# Patient Record
Sex: MALE | Race: WHITE | NOT HISPANIC OR LATINO | URBAN - METROPOLITAN AREA
[De-identification: names, ages, dates, MRNs, and addresses within clinical notes are randomized per-mention and may not be internally consistent; named-entity substitution may affect disease eponyms.]

---

## 2020-07-04 ENCOUNTER — EMERGENCY (EMERGENCY)
Facility: HOSPITAL | Age: 50
LOS: 0 days | Discharge: HOME | End: 2020-07-05
Attending: EMERGENCY MEDICINE | Admitting: EMERGENCY MEDICINE
Payer: MEDICAID

## 2020-07-04 VITALS
SYSTOLIC BLOOD PRESSURE: 139 MMHG | RESPIRATION RATE: 18 BRPM | DIASTOLIC BLOOD PRESSURE: 77 MMHG | OXYGEN SATURATION: 98 % | TEMPERATURE: 99 F | HEART RATE: 87 BPM

## 2020-07-04 DIAGNOSIS — R55 SYNCOPE AND COLLAPSE: ICD-10-CM

## 2020-07-04 LAB
BASOPHILS # BLD AUTO: 0.05 K/UL — SIGNIFICANT CHANGE UP (ref 0–0.2)
BASOPHILS NFR BLD AUTO: 0.4 % — SIGNIFICANT CHANGE UP (ref 0–1)
EOSINOPHIL # BLD AUTO: 0.04 K/UL — SIGNIFICANT CHANGE UP (ref 0–0.7)
EOSINOPHIL NFR BLD AUTO: 0.3 % — SIGNIFICANT CHANGE UP (ref 0–8)
HCT VFR BLD CALC: 35.6 % — LOW (ref 42–52)
HGB BLD-MCNC: 11.6 G/DL — LOW (ref 14–18)
IMM GRANULOCYTES NFR BLD AUTO: 0.6 % — HIGH (ref 0.1–0.3)
LYMPHOCYTES # BLD AUTO: 2.55 K/UL — SIGNIFICANT CHANGE UP (ref 1.2–3.4)
LYMPHOCYTES # BLD AUTO: 21 % — SIGNIFICANT CHANGE UP (ref 20.5–51.1)
MCHC RBC-ENTMCNC: 30 PG — SIGNIFICANT CHANGE UP (ref 27–31)
MCHC RBC-ENTMCNC: 32.6 G/DL — SIGNIFICANT CHANGE UP (ref 32–37)
MCV RBC AUTO: 92 FL — SIGNIFICANT CHANGE UP (ref 80–94)
MONOCYTES # BLD AUTO: 0.87 K/UL — HIGH (ref 0.1–0.6)
MONOCYTES NFR BLD AUTO: 7.1 % — SIGNIFICANT CHANGE UP (ref 1.7–9.3)
NEUTROPHILS # BLD AUTO: 8.59 K/UL — HIGH (ref 1.4–6.5)
NEUTROPHILS NFR BLD AUTO: 70.6 % — SIGNIFICANT CHANGE UP (ref 42.2–75.2)
NRBC # BLD: 0 /100 WBCS — SIGNIFICANT CHANGE UP (ref 0–0)
PLATELET # BLD AUTO: 254 K/UL — SIGNIFICANT CHANGE UP (ref 130–400)
RBC # BLD: 3.87 M/UL — LOW (ref 4.7–6.1)
RBC # FLD: 12.8 % — SIGNIFICANT CHANGE UP (ref 11.5–14.5)
WBC # BLD: 12.17 K/UL — HIGH (ref 4.8–10.8)
WBC # FLD AUTO: 12.17 K/UL — HIGH (ref 4.8–10.8)

## 2020-07-04 PROCEDURE — 71045 X-RAY EXAM CHEST 1 VIEW: CPT | Mod: 26

## 2020-07-04 PROCEDURE — 99285 EMERGENCY DEPT VISIT HI MDM: CPT

## 2020-07-04 RX ORDER — SODIUM CHLORIDE 9 MG/ML
1000 INJECTION INTRAMUSCULAR; INTRAVENOUS; SUBCUTANEOUS ONCE
Refills: 0 | Status: COMPLETED | OUTPATIENT
Start: 2020-07-04 | End: 2020-07-04

## 2020-07-04 RX ADMIN — SODIUM CHLORIDE 1000 MILLILITER(S): 9 INJECTION INTRAMUSCULAR; INTRAVENOUS; SUBCUTANEOUS at 23:39

## 2020-07-04 NOTE — ED ADULT TRIAGE NOTE - CHIEF COMPLAINT QUOTE
pt BIBA from home, pt states he was laying on the couch when he began feeling light headed and passed out

## 2020-07-05 ENCOUNTER — INPATIENT (INPATIENT)
Facility: HOSPITAL | Age: 50
LOS: 1 days | Discharge: HOME | End: 2020-07-07
Attending: FAMILY MEDICINE | Admitting: FAMILY MEDICINE
Payer: MEDICAID

## 2020-07-05 VITALS
SYSTOLIC BLOOD PRESSURE: 140 MMHG | OXYGEN SATURATION: 98 % | RESPIRATION RATE: 18 BRPM | HEART RATE: 99 BPM | DIASTOLIC BLOOD PRESSURE: 81 MMHG

## 2020-07-05 VITALS
DIASTOLIC BLOOD PRESSURE: 68 MMHG | RESPIRATION RATE: 22 BRPM | HEIGHT: 68 IN | OXYGEN SATURATION: 99 % | SYSTOLIC BLOOD PRESSURE: 128 MMHG | WEIGHT: 270.07 LBS | TEMPERATURE: 99 F | HEART RATE: 106 BPM

## 2020-07-05 LAB
ABO RH CONFIRMATION: SIGNIFICANT CHANGE UP
ALBUMIN SERPL ELPH-MCNC: 4.1 G/DL — SIGNIFICANT CHANGE UP (ref 3.5–5.2)
ALBUMIN SERPL ELPH-MCNC: 4.6 G/DL — SIGNIFICANT CHANGE UP (ref 3.5–5.2)
ALP SERPL-CCNC: 38 U/L — SIGNIFICANT CHANGE UP (ref 30–115)
ALP SERPL-CCNC: 39 U/L — SIGNIFICANT CHANGE UP (ref 30–115)
ALT FLD-CCNC: 29 U/L — SIGNIFICANT CHANGE UP (ref 0–41)
ALT FLD-CCNC: 35 U/L — SIGNIFICANT CHANGE UP (ref 0–41)
ANION GAP SERPL CALC-SCNC: 10 MMOL/L — SIGNIFICANT CHANGE UP (ref 7–14)
ANION GAP SERPL CALC-SCNC: 12 MMOL/L — SIGNIFICANT CHANGE UP (ref 7–14)
APTT BLD: 28.7 SEC — SIGNIFICANT CHANGE UP (ref 27–39.2)
AST SERPL-CCNC: 17 U/L — SIGNIFICANT CHANGE UP (ref 0–41)
AST SERPL-CCNC: 17 U/L — SIGNIFICANT CHANGE UP (ref 0–41)
BASOPHILS # BLD AUTO: 0.04 K/UL — SIGNIFICANT CHANGE UP (ref 0–0.2)
BASOPHILS NFR BLD AUTO: 0.4 % — SIGNIFICANT CHANGE UP (ref 0–1)
BILIRUB SERPL-MCNC: 0.4 MG/DL — SIGNIFICANT CHANGE UP (ref 0.2–1.2)
BILIRUB SERPL-MCNC: <0.2 MG/DL — SIGNIFICANT CHANGE UP (ref 0.2–1.2)
BLD GP AB SCN SERPL QL: SIGNIFICANT CHANGE UP
BUN SERPL-MCNC: 37 MG/DL — HIGH (ref 10–20)
BUN SERPL-MCNC: 51 MG/DL — HIGH (ref 10–20)
CALCIUM SERPL-MCNC: 8.4 MG/DL — LOW (ref 8.5–10.1)
CALCIUM SERPL-MCNC: 9.5 MG/DL — SIGNIFICANT CHANGE UP (ref 8.5–10.1)
CHLORIDE SERPL-SCNC: 102 MMOL/L — SIGNIFICANT CHANGE UP (ref 98–110)
CHLORIDE SERPL-SCNC: 104 MMOL/L — SIGNIFICANT CHANGE UP (ref 98–110)
CO2 SERPL-SCNC: 22 MMOL/L — SIGNIFICANT CHANGE UP (ref 17–32)
CO2 SERPL-SCNC: 25 MMOL/L — SIGNIFICANT CHANGE UP (ref 17–32)
CREAT SERPL-MCNC: 0.8 MG/DL — SIGNIFICANT CHANGE UP (ref 0.7–1.5)
CREAT SERPL-MCNC: 0.9 MG/DL — SIGNIFICANT CHANGE UP (ref 0.7–1.5)
EOSINOPHIL # BLD AUTO: 0.04 K/UL — SIGNIFICANT CHANGE UP (ref 0–0.7)
EOSINOPHIL NFR BLD AUTO: 0.4 % — SIGNIFICANT CHANGE UP (ref 0–8)
GLUCOSE SERPL-MCNC: 108 MG/DL — HIGH (ref 70–99)
GLUCOSE SERPL-MCNC: 119 MG/DL — HIGH (ref 70–99)
HCT VFR BLD CALC: 26.8 % — LOW (ref 42–52)
HGB BLD-MCNC: 9.2 G/DL — LOW (ref 14–18)
IMM GRANULOCYTES NFR BLD AUTO: 0.7 % — HIGH (ref 0.1–0.3)
INR BLD: 1.01 RATIO — SIGNIFICANT CHANGE UP (ref 0.65–1.3)
LIDOCAIN IGE QN: 62 U/L — HIGH (ref 7–60)
LYMPHOCYTES # BLD AUTO: 2.84 K/UL — SIGNIFICANT CHANGE UP (ref 1.2–3.4)
LYMPHOCYTES # BLD AUTO: 27.3 % — SIGNIFICANT CHANGE UP (ref 20.5–51.1)
MAGNESIUM SERPL-MCNC: 2 MG/DL — SIGNIFICANT CHANGE UP (ref 1.8–2.4)
MCHC RBC-ENTMCNC: 30.9 PG — SIGNIFICANT CHANGE UP (ref 27–31)
MCHC RBC-ENTMCNC: 34.3 G/DL — SIGNIFICANT CHANGE UP (ref 32–37)
MCV RBC AUTO: 89.9 FL — SIGNIFICANT CHANGE UP (ref 80–94)
MONOCYTES # BLD AUTO: 0.59 K/UL — SIGNIFICANT CHANGE UP (ref 0.1–0.6)
MONOCYTES NFR BLD AUTO: 5.7 % — SIGNIFICANT CHANGE UP (ref 1.7–9.3)
NEUTROPHILS # BLD AUTO: 6.82 K/UL — HIGH (ref 1.4–6.5)
NEUTROPHILS NFR BLD AUTO: 65.5 % — SIGNIFICANT CHANGE UP (ref 42.2–75.2)
NRBC # BLD: 0 /100 WBCS — SIGNIFICANT CHANGE UP (ref 0–0)
PLATELET # BLD AUTO: 231 K/UL — SIGNIFICANT CHANGE UP (ref 130–400)
POTASSIUM SERPL-MCNC: 4.3 MMOL/L — SIGNIFICANT CHANGE UP (ref 3.5–5)
POTASSIUM SERPL-MCNC: 4.5 MMOL/L — SIGNIFICANT CHANGE UP (ref 3.5–5)
POTASSIUM SERPL-SCNC: 4.3 MMOL/L — SIGNIFICANT CHANGE UP (ref 3.5–5)
POTASSIUM SERPL-SCNC: 4.5 MMOL/L — SIGNIFICANT CHANGE UP (ref 3.5–5)
PROT SERPL-MCNC: 5.7 G/DL — LOW (ref 6–8)
PROT SERPL-MCNC: 6.1 G/DL — SIGNIFICANT CHANGE UP (ref 6–8)
PROTHROM AB SERPL-ACNC: 11.6 SEC — SIGNIFICANT CHANGE UP (ref 9.95–12.87)
RBC # BLD: 2.98 M/UL — LOW (ref 4.7–6.1)
RBC # FLD: 12.8 % — SIGNIFICANT CHANGE UP (ref 11.5–14.5)
SALICYLATES SERPL-MCNC: <0.3 MG/DL — LOW (ref 4–30)
SARS-COV-2 RNA SPEC QL NAA+PROBE: SIGNIFICANT CHANGE UP
SODIUM SERPL-SCNC: 136 MMOL/L — SIGNIFICANT CHANGE UP (ref 135–146)
SODIUM SERPL-SCNC: 139 MMOL/L — SIGNIFICANT CHANGE UP (ref 135–146)
TROPONIN T SERPL-MCNC: <0.01 NG/ML — SIGNIFICANT CHANGE UP
TROPONIN T SERPL-MCNC: <0.01 NG/ML — SIGNIFICANT CHANGE UP
WBC # BLD: 10.4 K/UL — SIGNIFICANT CHANGE UP (ref 4.8–10.8)
WBC # FLD AUTO: 10.4 K/UL — SIGNIFICANT CHANGE UP (ref 4.8–10.8)

## 2020-07-05 PROCEDURE — 99223 1ST HOSP IP/OBS HIGH 75: CPT

## 2020-07-05 PROCEDURE — 71045 X-RAY EXAM CHEST 1 VIEW: CPT | Mod: 26

## 2020-07-05 PROCEDURE — 99285 EMERGENCY DEPT VISIT HI MDM: CPT

## 2020-07-05 PROCEDURE — 99222 1ST HOSP IP/OBS MODERATE 55: CPT

## 2020-07-05 RX ORDER — PANTOPRAZOLE SODIUM 20 MG/1
8 TABLET, DELAYED RELEASE ORAL
Qty: 80 | Refills: 0 | Status: DISCONTINUED | OUTPATIENT
Start: 2020-07-05 | End: 2020-07-06

## 2020-07-05 RX ORDER — ONDANSETRON 8 MG/1
4 TABLET, FILM COATED ORAL EVERY 6 HOURS
Refills: 0 | Status: DISCONTINUED | OUTPATIENT
Start: 2020-07-05 | End: 2020-07-07

## 2020-07-05 RX ORDER — SODIUM CHLORIDE 9 MG/ML
1000 INJECTION INTRAMUSCULAR; INTRAVENOUS; SUBCUTANEOUS
Refills: 0 | Status: DISCONTINUED | OUTPATIENT
Start: 2020-07-05 | End: 2020-07-06

## 2020-07-05 RX ORDER — PANTOPRAZOLE SODIUM 20 MG/1
40 TABLET, DELAYED RELEASE ORAL ONCE
Refills: 0 | Status: COMPLETED | OUTPATIENT
Start: 2020-07-05 | End: 2020-07-05

## 2020-07-05 RX ADMIN — PANTOPRAZOLE SODIUM 10 MG/HR: 20 TABLET, DELAYED RELEASE ORAL at 21:42

## 2020-07-05 RX ADMIN — PANTOPRAZOLE SODIUM 40 MILLIGRAM(S): 20 TABLET, DELAYED RELEASE ORAL at 20:41

## 2020-07-05 NOTE — H&P ADULT - NSHPLABSRESULTS_GEN_ALL_CORE
9.2    10.40 )-----------( 231      ( 05 Jul 2020 20:40 )             26.8       07-05    136  |  104  |  37<H>  ----------------------------<  119<H>  4.3   |  22  |  0.9    Ca    8.4<L>      05 Jul 2020 20:40  Mg     2.0     07-04    TPro  5.7<L>  /  Alb  4.1  /  TBili  <0.2  /  DBili  x   /  AST  17  /  ALT  29  /  AlkPhos  38  07-05                  PT/INR - ( 05 Jul 2020 20:40 )   PT: 11.60 sec;   INR: 1.01 ratio         PTT - ( 05 Jul 2020 20:40 )  PTT:28.7 sec    Lactate Trend      CARDIAC MARKERS ( 05 Jul 2020 20:40 )  x     / <0.01 ng/mL / x     / x     / x      CARDIAC MARKERS ( 04 Jul 2020 23:32 )  x     / <0.01 ng/mL / x     / x     / x            CAPILLARY BLOOD GLUCOSE      POCT Blood Glucose.: 111 mg/dL (04 Jul 2020 22:43)

## 2020-07-05 NOTE — ED PROVIDER NOTE - OBJECTIVE STATEMENT
49 year old male no past medical history p/w near syncope. Patient seen in Ed yesterday and d/c home after negative w/u. Tpday patient felt lightheaded but did not pass out. He also admits to having a large black bowel movement. patient denies hx of melena/gib but does admit to taking meloxican, aspirin in combination the past week. He denies chest pain, shortness of breath, fevers, cough, abd pain, hematuria, dysuria. no bloodthinners/trauma. His lightheadedness is worse with standing, better with lying down, intermittent, mild, worsening. never had colo or seen by gi. + FH colon CA

## 2020-07-05 NOTE — ED PROVIDER NOTE - ATTENDING CONTRIBUTION TO CARE
I personally evaluated the patient. I reviewed the Resident’s or Physician Assistant’s note (as assigned above), and agree with the findings and plan except as documented in my note.    Pt is a 50 y/o male with no PMH who presents to ED for syncopal episode that occurred PTA while pt was sitting on couch, + tunnel vision prior to episode, no chest pain, SOB, palpitations, abd pain, n/v/d. Pt states was outside all day today, + 1 drink. Sx's were mild, constant, resolved. Currently asymptomatic.  Constitutional: Well developed, well nourished. NAD.  Head: Normocephalic, atraumatic.  Eyes: PERRL. EOMI.  ENT: No nasal discharge. Mucous membranes moist.  Neck: Supple. Painless ROM.  Cardiovascular: Normal S1, S2. Regular rate and rhythm. No murmurs, rubs, or gallops.  Pulmonary: Normal respiratory rate and effort. Lungs clear to auscultation bilaterally. No wheezing, rales, or rhonchi.  Abdominal: Soft. Nondistended. Nontender. No rebound, guarding, rigidity.  Extremities. Pelvis stable. No lower extremity edema, symmetric calves.  Skin: No rashes, cyanosis.  Neuro: AAOx3. No focal neurological deficits.  Psych: Normal mood. Normal affect.

## 2020-07-05 NOTE — H&P ADULT - NSHPPHYSICALEXAM_GEN_ALL_CORE
PHYSICAL EXAM:    CONSTITUTIONAL: NAD, saturating at >90% on RA.   ENMT: EOMI, PERRLA,  neck supple, No JVD  RESPIRATORY: Clear to percussion bilaterally; No rales, rhonchi, wheezing, or rubs  CARDIOVASCULAR: Regular rate and rhythm; No murmurs, rubs, or gallops, negative edema  GASTROINTESTINAL: Soft, Nontender, Nondistended; Bowel sounds present  EXTREMITIES:  2+ Peripheral Pulses, No clubbing, cyanosis  PSYCH: Alert & Oriented X3, denies suicidal or homicidal ideation, denies auditory or visual hallucinations   NEURO: A&O X3, follows commands. Non focal neuro exam.   SKIN: No rashes or lesions PHYSICAL EXAM:    CONSTITUTIONAL: NAD, saturating at >90% on RA.   ENMT: EOMI, PERRLA,  neck supple, No JVD  RESPIRATORY: Clear to percussion bilaterally; No rales, rhonchi, wheezing, or rubs  CARDIOVASCULAR: Regular rate and rhythm; No murmurs, rubs, or gallops, negative edema  GASTROINTESTINAL: Soft, Nontender, Nondistended; Bowel sounds present  EXTREMITIES:  2+ Peripheral Pulses, No clubbing, cyanosis  PSYCH: Alert & Oriented X3, denies suicidal or homicidal ideation, denies auditory or visual hallucinations   NEURO: A&O X3, follows commands. Non focal neuro exam.   SKIN: No rashes or lesions    /60 HR98

## 2020-07-05 NOTE — ED PROVIDER NOTE - PATIENT PORTAL LINK FT
You can access the FollowMyHealth Patient Portal offered by [facility] by registering at the following website: [email or web address]. By joining [employer]’s FollowMyHealth portal, you will also be able to view your health information using other applications (apps) compatible with our system.

## 2020-07-05 NOTE — ED PROVIDER NOTE - PHYSICAL EXAMINATION
Physical Exam    Vital Signs: I have reviewed the initial vital signs  Constitutional: well-nourished, appears stated age, no acute distress  EENT: Conjunctiva pink, Sclera clear, PERRLA, EOMI. Mucous membranes moist, no exudates or lesions noted, uvula midline.  Cardiovascular: S1 and S2 present, tachycardic, regular rhythm. Well perfused extremities, no peripheral edema  Respiratory: unlabored respiratory effort, clear to auscultation bilaterally no wheezing, rales or rhonchi  Gastrointestinal: soft, non-tender abdomen. No guarding or rebound tenderness  Musculoskeletal: supple nontender neck, no midline tenderness, no joint pain  Integumentary: warm, dry, no rash  Rectal: No bleeding, rash, or lesions noted externally. Sphincter tone intact. Black stool. Prostate within normal limits  Neurologic: A & O x 3, CN II-XII grossly intact, all extremities’ motor and sensory functions grossly intact  Psychiatric: appropriate mood, appropriate affect

## 2020-07-05 NOTE — CONSULT NOTE ADULT - SUBJECTIVE AND OBJECTIVE BOX
50 yo W male w/ PMH arthritis on meloxicam, advil, aspirins, pt was in ER c/o of near syncopal episode ,  w/u showed mild anemia pt. was discharged told to follow up w/PMD.  today pt returns w/ large melanotic stools w/ 2.4 gram blood loss (11.6-9.2).  Pt denies -tobacco use,  etoh abuse (but drinks socially) but had some drinks yesterday.    presently pts VSS , afebrile.  GI ( Dr Godinez was called and recommended adm. for egd)            PAST MEDICAL & SURGICAL HISTORY:  hx-arthritis on NSAIDS  No significant past surgical history    Allergies    No Known Allergies    Intolerances      FAMILY HISTORY:    Home Medications:  aspirin 81 mg oral tablet, chewable: 1 tab(s) orally once a day (04 Jul 2020 23:17)  meloxicam 10 mg oral capsule: 1 cap(s) orally once a day (04 Jul 2020 23:17)    Occupation:  [employer]: social drinker  Smoking: Denied  Drug Use: Denied  Marital Status:         ROS: as in HPI; All other systems reviewed are negative    ICU Vital Signs Last 24 Hrs  T(C): 37.1 (05 Jul 2020 20:17), Max: 37.2 (04 Jul 2020 22:42)  T(F): 98.7 (05 Jul 2020 20:17), Max: 99 (04 Jul 2020 22:42)  HR: 106 (05 Jul 2020 20:17) (87 - 106)  BP: 128/68 (05 Jul 2020 20:17) (128/68 - 140/81)  BP(mean): --  ABP: --  ABP(mean): --  RR: 22 (05 Jul 2020 20:17) (18 - 22)  SpO2: 99% (05 Jul 2020 20:17) (98% - 99%)        Physical Examination:    General:  Middle aged w/ male .No acute distress.  Alert, oriented, interactive, nonfocal    HEENT: Pupils equal, reactive to light.  Symmetric.    PULM: Clear to auscultation bilaterally, no significant sputum production    CVS: Regular rate and rhythm, no murmurs, rubs, or gallops    ABD: Soft, nondistended, nontender, normoactive bowel sounds, no masses    EXT: No edema, nontender    SKIN: Warm and well perfused, no rashes noted.              I&O's Detail        LABS:                        9.2    10.40 )-----------( 231      ( 05 Jul 2020 20:40 )             26.8     05 Jul 2020 20:40    136    |  104    |  37     ----------------------------<  119    4.3     |  22     |  0.9      Ca    8.4        05 Jul 2020 20:40  Mg     2.0       04 Jul 2020 23:32    TPro  5.7    /  Alb  4.1    /  TBili  <0.2   /  DBili  x      /  AST  17     /  ALT  29     /  AlkPhos  38     05 Jul 2020 20:40  Amylase x     lipase 62         CARDIAC MARKERS ( 05 Jul 2020 20:40 )  x     / <0.01 ng/mL / x     / x     / x      CARDIAC MARKERS ( 04 Jul 2020 23:32 )  x     / <0.01 ng/mL / x     / x     / x          CAPILLARY BLOOD GLUCOSE      POCT Blood Glucose.: 111 mg/dL (04 Jul 2020 22:43)    PT/INR - ( 05 Jul 2020 20:40 )   PT: 11.60 sec;   INR: 1.01 ratio         PTT - ( 05 Jul 2020 20:40 )  PTT:28.7 sec    Culture        MEDICATIONS  (STANDING):    MEDICATIONS  (PRN):        RADIOLOGY: ***     CXR:clear         IMPRESSION:  near syncope secondary to acute blood loss anemia        PLAN:    discussed  w/Dr Quiñonez (ICU)  Pt's VSS afebrile  no need for ICU @ this time  already on IV protonix  GI already was called for egd in am  adm to low risk tele  repeat CBC in 6 hr  transfuse for hgb <8  keep npo for EGD in am  I          CRITICAL CARE TIME SPENT: ***

## 2020-07-05 NOTE — ED PROVIDER NOTE - OBJECTIVE STATEMENT
48 y/o M no pmhx p/w syncope, patient was on couch, sts preceding tunnel vision and had syncopal episode, np preceding chest pain, shortness of breath or lightheadedness. States he was outside all day, admits to poor PO intake of fluids, no vomiting or diarrhea, no complaints in the ED now.

## 2020-07-05 NOTE — ED PROVIDER NOTE - CARE PROVIDER_API CALL
Eileen Adnrzej  INTERNAL MEDICINE  [address] [place] [id number]  Phone: [phone]  Fax: [phone]  Follow Up Time: 1-3 Days

## 2020-07-05 NOTE — H&P ADULT - ASSESSMENT
This is a 40 YO M who presents with a GI bleed. Currently hemodynamically stable, not actively bleeding. Will monitor CBC closely, transfuse PRN, NS @100ml/hr, protonix drip, GI consulted in the ER and will scope in the AM, NPO for now. Critical care reccs appreciated.       DVT ppx SCD This is a 40 YO M who presents with a GI bleed. Currently hemodynamically stable, not actively bleeding. Will monitor CBC closely, transfuse PRN, NS @100ml/hr, protonix drip, GI consulted in the ER and will scope in the AM, NPO for now. Critical care reccs appreciated. Symptomatic normocytic anemia due to GIB, hemodynamically stable, monitor cbc, transfuse PRN.       DVT ppx SCD

## 2020-07-05 NOTE — ED PROVIDER NOTE - CLINICAL SUMMARY MEDICAL DECISION MAKING FREE TEXT BOX
Pt presented to ED for near syncope, seen yesterday for similar c/o with normal workup. Today also with melena, confirmed with rectal exam. Hbg drop since yesterday. No need for acute transfusion, VSS. PPI started. Discussed with GI, will see pt in am. Discussed with ICU, not candidate at this time. Endorsed to medical team.

## 2020-07-05 NOTE — H&P ADULT - HISTORY OF PRESENT ILLNESS
Pt is a 40 YO M with a pmh of arthritis. Pt presented to the ER due to a large melanotic bowel movement associated with lightheadedness earlier this evening. Pt does endorse taking extra strength bryan, molixicam (which he has been taking for years). In the ER, pt noted to have a hb of 9.2, tachycardic although otherwise hemodynamically stable, melanotic stools in his rectal vault. Pt was seen by critical care and deemed not a candidate for an ICU admission. Pt was seen and examined at bedside. Pt denies any hx of blood in his stools, and denies nausea, vomiting, hematemesis, abdominal pain.

## 2020-07-05 NOTE — CONSULT NOTE ADULT - SUBJECTIVE AND OBJECTIVE BOX
Patient is a 49y old  Male who presents with a chief complaint of block stool    HPI:  pt c/o dizziness, near syncope, w/ black stools. pt was in ER yesterday and was discharged home. Pt now returns w/2.4 gm loss in Hemoglobin (11.6 to 9.2)  w/large melena. Pt uses meloxicam, ibuprofen, aspirin fro chronic arthritis, pt denies: alcohol abuse (but drinks socially), or tobacco use. Presently, VSS, afebrile. GI was already called.       PAST MEDICAL & SURGICAL HISTORY:  Arthritis on NSAIDS  No significant past surgical history    Allergies    No Known Allergies    Intolerances      FAMILY HISTORY:    Home Medications:  aspirin 81 mg oral tablet, chewable: 1 tab(s) orally once a day (04 Jul 2020 23:17)  meloxicam 10 mg oral capsule: 1 cap(s) orally once a day (04 Jul 2020 23:17)    Occupation:  Alochol: Denied  Smoking: Denied  Drug Use: Denied  Marital Status:         ROS: as in HPI; All other systems reviewed are negative    ICU Vital Signs Last 24 Hrs  T(C): 37.1 (05 Jul 2020 20:17), Max: 37.1 (05 Jul 2020 20:17)  T(F): 98.7 (05 Jul 2020 20:17), Max: 98.7 (05 Jul 2020 20:17)  HR: 106 (05 Jul 2020 20:17) (99 - 106)  BP: 128/68 (05 Jul 2020 20:17) (128/68 - 140/81)  BP(mean): --  ABP: --  ABP(mean): --  RR: 22 (05 Jul 2020 20:17) (18 - 22)  SpO2: 99% (05 Jul 2020 20:17) (98% - 99%)        Physical Examination:    General: Middle aged male , WD, WN in no acute distress.  Alert, oriented, interactive, nonfocal    HEENT: Pupils equal, reactive to light.  Symmetric.    PULM: Clear to auscultation bilaterally, no significant sputum production    CVS: Regular rate and rhythm, no murmurs, rubs, or gallops    ABD: Soft, nondistended, nontender, normoactive bowel sounds, no masses    EXT: No edema, nontender    SKIN: Warm and well perfused, no rashes noted.              I&O's Detail        LABS:                        9.2    10.40 )-----------( 231      ( 05 Jul 2020 20:40 )             26.8     05 Jul 2020 20:40    136    |  104    |  37     ----------------------------<  119    4.3     |  22     |  0.9      Ca    8.4        05 Jul 2020 20:40  Mg     2.0       04 Jul 2020 23:32    TPro  5.7    /  Alb  4.1    /  TBili  <0.2   /  DBili  x      /  AST  17     /  ALT  29     /  AlkPhos  38     05 Jul 2020 20:40  Amylase x     lipase 62         CARDIAC MARKERS ( 05 Jul 2020 20:40 )  x     / <0.01 ng/mL / x     / x     / x      CARDIAC MARKERS ( 04 Jul 2020 23:32 )  x     / <0.01 ng/mL / x     / x     / x          CAPILLARY BLOOD GLUCOSE      POCT Blood Glucose.: 111 mg/dL (04 Jul 2020 22:43)    PT/INR - ( 05 Jul 2020 20:40 )   PT: 11.60 sec;   INR: 1.01 ratio         PTT - ( 05 Jul 2020 20:40 )  PTT:28.7 sec    Culture        MEDICATIONS  (STANDING):  pantoprazole Infusion 8 mG/Hr (10 mL/Hr) IV Continuous <Continuous>    MEDICATIONS  (PRN):        RADIOLOGY: ***     CXR: clear                IMPRESSION:  Acute blood loss anemia 2 to acute upper GI bleed        PLAN:  Discussed w/intensivist Olamide  pt's VSS afebrile  No need for ICU @ this time  keep npo for egd in am as per GI 's initial impression  IV protonix  cbc q 6-8 hrs  transfuse if hgb <8          CRITICAL CARE TIME SPENT: ***

## 2020-07-05 NOTE — ED PROVIDER NOTE - CLINICAL SUMMARY MEDICAL DECISION MAKING FREE TEXT BOX
Pt with no PMH presented to eD for syncope, currently back to baseline. Likely vasovagal after dehydration, being outside all day. Labs, imaging EKG obtained and reviewed. Low risk per edwards joyce syncope rule. Results reviewed and discussed with pt and printed for patient. Anticipatory guidance given including close outpatient followup. Strict return precautions given. Pt verbalizes understanding of and agrees with plan.

## 2020-07-05 NOTE — ED PROVIDER NOTE - PROGRESS NOTE DETAILS
PODLOG: Discussed with Dr. Godinez. Plan is NPO, scope tomorrow unless decompensates. Discussed with ICU, will evaluate pt.

## 2020-07-05 NOTE — ED PROVIDER NOTE - NS ED ROS FT
Review of Systems         Constitutional: (-) fever (-) chills (-) weakness       EENT: (-) visual changes (-) sore throat (-) congestion       Cardiovascular: (-) chest pain (-) syncope       Respiratory: (-) cough, (-) shortness of breath       Gastrointestinal: (-) abdominal pain (-) vomiting (-) diarrhea (-) nausea (-) constipation       Genitourinary: (-) dysuria (-) frequency (-) hematuria       Musculoskeletal: (-) neck pain (-) back pain (-) joint pain       Integumentary: (-) rash       Neurological: (-) headache (-) altered mental status (+) dizziness (-) paresthesias       Psych: (-) psych history

## 2020-07-05 NOTE — ED PROVIDER NOTE - PHYSICAL EXAMINATION
VITAL SIGNS: I have reviewed nursing notes and confirm.  CONSTITUTIONAL: well-appearing, non-toxic  SKIN: Warm dry, normal skin turgor  HEAD: NCAT  EYES: EOMI, PERRLA, no scleral icterus  ENT: dry mucous membranes,   NECK: Supple; non tender. Full ROM.   CARD: RRR, no murmurs, rubs or gallops  RESP: clear to ausculation b/l.  No rales, rhonchi, or wheezing.  ABD: soft, + BS, non-tender, non-distended, no rebound or guarding.   EXT: Full ROM, no bony tenderness, no pedal edema, no calf tenderness  NEURO: normal motor. normal sensory. CN II-XII intact. Cerebellar testing normal. Normal gait.  PSYCH: Cooperative, appropriate.

## 2020-07-05 NOTE — ED PROVIDER NOTE - ATTENDING CONTRIBUTION TO CARE
I personally evaluated the patient. I reviewed the Resident’s or Physician Assistant’s note (as assigned above), and agree with the findings and plan except as documented in my note.    Pt is a 48 y/o male with no PMH, takes NSAIDs and ASA for joint pain, here yesterday for syncope episode, presents to ED for another episode of lightheadedness that occurred PTA. Mild, constant, resolved. Pt also with episode of melena. No BRBPR, abd pain, n/v, chest pain, SOB, cough.    Constitutional: Well developed, well nourished. NAD.  Head: Normocephalic, atraumatic.  Eyes: PERRL. EOMI.  ENT: No nasal discharge. Mucous membranes moist.  Neck: Supple. Painless ROM.  Cardiovascular: Normal S1, S2. Regular rate and rhythm. No murmurs, rubs, or gallops.  Pulmonary: Normal respiratory rate and effort. Lungs clear to auscultation bilaterally. No wheezing, rales, or rhonchi.  Abdominal: Soft. Nondistended. Nontender. No rebound, guarding, rigidity.  Rectal: + melena.  Extremities. Pelvis stable. No lower extremity edema, symmetric calves.  Skin: No rashes, cyanosis.  Neuro: AAOx3. No focal neurological deficits.  Psych: Normal mood. Normal affect.

## 2020-07-06 ENCOUNTER — TRANSCRIPTION ENCOUNTER (OUTPATIENT)
Age: 50
End: 2020-07-06

## 2020-07-06 ENCOUNTER — RESULT REVIEW (OUTPATIENT)
Age: 50
End: 2020-07-06

## 2020-07-06 LAB
HCT VFR BLD CALC: 23.3 % — LOW (ref 42–52)
HCT VFR BLD CALC: 24 % — LOW (ref 42–52)
HCT VFR BLD CALC: 24.4 % — LOW (ref 42–52)
HGB BLD-MCNC: 7.9 G/DL — LOW (ref 14–18)
HGB BLD-MCNC: 8.1 G/DL — LOW (ref 14–18)
HGB BLD-MCNC: 8.4 G/DL — LOW (ref 14–18)
LIDOCAIN IGE QN: 55 U/L — SIGNIFICANT CHANGE UP (ref 7–60)
MCHC RBC-ENTMCNC: 30.3 PG — SIGNIFICANT CHANGE UP (ref 27–31)
MCHC RBC-ENTMCNC: 30.4 PG — SIGNIFICANT CHANGE UP (ref 27–31)
MCHC RBC-ENTMCNC: 30.5 PG — SIGNIFICANT CHANGE UP (ref 27–31)
MCHC RBC-ENTMCNC: 33.8 G/DL — SIGNIFICANT CHANGE UP (ref 32–37)
MCHC RBC-ENTMCNC: 33.9 G/DL — SIGNIFICANT CHANGE UP (ref 32–37)
MCHC RBC-ENTMCNC: 34.4 G/DL — SIGNIFICANT CHANGE UP (ref 32–37)
MCV RBC AUTO: 88.4 FL — SIGNIFICANT CHANGE UP (ref 80–94)
MCV RBC AUTO: 89.9 FL — SIGNIFICANT CHANGE UP (ref 80–94)
MCV RBC AUTO: 90 FL — SIGNIFICANT CHANGE UP (ref 80–94)
NRBC # BLD: 0 /100 WBCS — SIGNIFICANT CHANGE UP (ref 0–0)
PLATELET # BLD AUTO: 158 K/UL — SIGNIFICANT CHANGE UP (ref 130–400)
PLATELET # BLD AUTO: 176 K/UL — SIGNIFICANT CHANGE UP (ref 130–400)
PLATELET # BLD AUTO: 182 K/UL — SIGNIFICANT CHANGE UP (ref 130–400)
RBC # BLD: 2.59 M/UL — LOW (ref 4.7–6.1)
RBC # BLD: 2.67 M/UL — LOW (ref 4.7–6.1)
RBC # BLD: 2.76 M/UL — LOW (ref 4.7–6.1)
RBC # FLD: 12.9 % — SIGNIFICANT CHANGE UP (ref 11.5–14.5)
RBC # FLD: 13.5 % — SIGNIFICANT CHANGE UP (ref 11.5–14.5)
RBC # FLD: 13.7 % — SIGNIFICANT CHANGE UP (ref 11.5–14.5)
SARS-COV-2 IGG SERPL QL IA: NEGATIVE — SIGNIFICANT CHANGE UP
SARS-COV-2 IGM SERPL IA-ACNC: <0.1 INDEX — SIGNIFICANT CHANGE UP
WBC # BLD: 7.55 K/UL — SIGNIFICANT CHANGE UP (ref 4.8–10.8)
WBC # BLD: 7.76 K/UL — SIGNIFICANT CHANGE UP (ref 4.8–10.8)
WBC # BLD: 8.02 K/UL — SIGNIFICANT CHANGE UP (ref 4.8–10.8)
WBC # FLD AUTO: 7.55 K/UL — SIGNIFICANT CHANGE UP (ref 4.8–10.8)
WBC # FLD AUTO: 7.76 K/UL — SIGNIFICANT CHANGE UP (ref 4.8–10.8)
WBC # FLD AUTO: 8.02 K/UL — SIGNIFICANT CHANGE UP (ref 4.8–10.8)

## 2020-07-06 PROCEDURE — 88312 SPECIAL STAINS GROUP 1: CPT | Mod: 26

## 2020-07-06 PROCEDURE — 99233 SBSQ HOSP IP/OBS HIGH 50: CPT

## 2020-07-06 PROCEDURE — 43239 EGD BIOPSY SINGLE/MULTIPLE: CPT

## 2020-07-06 PROCEDURE — 88305 TISSUE EXAM BY PATHOLOGIST: CPT | Mod: 26

## 2020-07-06 PROCEDURE — 99223 1ST HOSP IP/OBS HIGH 75: CPT | Mod: 25

## 2020-07-06 RX ORDER — PANTOPRAZOLE SODIUM 20 MG/1
40 TABLET, DELAYED RELEASE ORAL
Refills: 0 | Status: DISCONTINUED | OUTPATIENT
Start: 2020-07-06 | End: 2020-07-07

## 2020-07-06 RX ADMIN — SODIUM CHLORIDE 100 MILLILITER(S): 9 INJECTION INTRAMUSCULAR; INTRAVENOUS; SUBCUTANEOUS at 12:13

## 2020-07-06 RX ADMIN — PANTOPRAZOLE SODIUM 10 MG/HR: 20 TABLET, DELAYED RELEASE ORAL at 12:13

## 2020-07-06 NOTE — PROGRESS NOTE ADULT - ASSESSMENT
Patient is 50 yo male presenting with with anemia, dark stool, and dizziness    1. Anemia  -Suspected GIB  -Continue with PPI  -Mild drop in H/H overnight  -Will transfuse one unit of RBC.  Risks, benefits, and alternative of blood transfusion were discussed with patient in details, seems to understand, and in agreement.  NPO  -Monitor H/H  -GI to follow up     2. dizziness/Near syncope  -Seems to be secondary to anemia   -EKG shows NSR non-specific changes  -TTE pending  -Treat underline issues    #Progress Note Handoff  Pending (specify):  GI consult  Family discussion:  plan of care was discussed with patient   in details.  all questions were answered.  seems to understand, and in agreement  Disposition:  unknown

## 2020-07-06 NOTE — PACU DISCHARGE NOTE - COMMENTS
49 y o male S/P EGD with Biopsies, TIVA without complications. VS /53 HR 77 RR 16 T 99 SaO2 100%. Pt tolerated procedure well.

## 2020-07-06 NOTE — CONSULT NOTE ADULT - SUBJECTIVE AND OBJECTIVE BOX
Chief complaint/Reason for consult: UGI Bleed    HPI:  Pt is a 50 YO M with a pmh of arthritis. Pt presented to the ER due to a large melanotic bowel movement associated with lightheadedness earlier this evening. Pt does endorse taking extra strength bryan, molixicam (which he has been taking for years). In the ER, pt noted to have a hb of 9.2, tachycardic although otherwise hemodynamically stable, melanotic stools in his rectal vault. Pt was seen by critical care and deemed not a candidate for an ICU admission. Pt was seen and examined at bedside. Pt denies any hx of blood in his stools, and denies nausea, vomiting, hematemesis, abdominal pain. (05 Jul 2020 23:28)    GI Updates: 49yMale pmh family history of colon cancer patient has never had an endoscopy or colonoscopy taking meloxicam and aspirin more than usual the past two weeks for knee pain presents s/p large melanotic bowel movement and near syncope. Patient reports he has never had black stools before. Currently Patient denies nausea, vomiting, hematemesis, melena, blood in stool, diarrhea, constipation, abdominal pain. Patient reports last melenic stool at 5pm yesterday,      PAST MEDICAL & SURGICAL HISTORY:   No pertinent past medical history  No significant past surgical history        Family history:  FAMILY HISTORY:  FH: peptic ulcer  -Father with colon cancer diagnosis age 40    No GI cancers in first or second degree relatives    Social History: No smoking. No alcohol. No illegal drug use.    Allergies:  No Known Allergies      MEDICATIONS: Home Medications:  Advil:  (05 Jul 2020 23:33)  aspirin 81 mg oral tablet, chewable: 1 tab(s) orally once a day (04 Jul 2020 23:17)  meloxicam 10 mg oral capsule: 1 cap(s) orally once a day (04 Jul 2020 23:17)    MEDICATIONS  (STANDING):  pantoprazole Infusion 8 mG/Hr (10 mL/Hr) IV Continuous <Continuous>  sodium chloride 0.9%. 1000 milliLiter(s) (100 mL/Hr) IV Continuous <Continuous>    MEDICATIONS  (PRN):  ondansetron Injectable 4 milliGRAM(s) IV Push every 6 hours PRN Nausea and/or Vomiting        REVIEW OF SYSTEMS  General:  No weight loss, fevers, or chills.  Eyes:  No reported pain or visual changes  ENT:  No sore throat or runny nose.  NECK: No stiffness or lymphadenopathy  CV:  No chest pain or palpitations.  Resp:  No shortness of breath, cough, wheezing or hemoptysis  GI:  No abdominal pain, nausea, vomiting, dysphagia, diarrhea or constipation. No rectal bleeding, melena, or hematemesis.  Neuro:  No tingling, numbness       VITALS:   T(F): 96.6 (07-06-20 @ 05:34), Max: 98.7 (07-05-20 @ 20:17)  HR: 77 (07-06-20 @ 05:34) (77 - 106)  BP: 110/68 (07-06-20 @ 05:34) (110/68 - 130/74)  RR: 18 (07-06-20 @ 05:34) (18 - 22)  SpO2: 99% (07-05-20 @ 22:59) (99% - 99%)    PHYSICAL EXAM:  GENERAL: AAOx3, no acute distress.  HEAD:  Atraumatic, Normocephalic  EYES: conjunctiva and sclera clear  NECK: Supple, No thyromegaly   CHEST/LUNG: Clear to auscultation bilaterally; No wheeze, rhonchi, or rales  HEART: Regular rate and rhythm; normal S1, S2, No murmurs.  ABDOMEN: Soft, nontender, nondistended; Bowel sounds present, no abdominal bruit, masses, or hepatosplenomegaly  NEUROLOGY: No asterixis or tremor  SKIN: Intact, no jaundice          LABS:  07-05    136  |  104  |  37<H>  ----------------------------<  119<H>  4.3   |  22  |  0.9    Ca    8.4<L>      05 Jul 2020 20:40  Mg     2.0     07-04    TPro  5.7<L>  /  Alb  4.1  /  TBili  <0.2  /  DBili  x   /  AST  17  /  ALT  29  /  AlkPhos  38  07-05                          7.9    7.76  )-----------( 176      ( 06 Jul 2020 06:32 )             23.3     LIVER FUNCTIONS - ( 05 Jul 2020 20:40 )  Alb: 4.1 g/dL / Pro: 5.7 g/dL / ALK PHOS: 38 U/L / ALT: 29 U/L / AST: 17 U/L / GGT: x           PT/INR - ( 05 Jul 2020 20:40 )   PT: 11.60 sec;   INR: 1.01 ratio         PTT - ( 05 Jul 2020 20:40 )  PTT:28.7 sec    IMAGING:    < from: Xray Chest 1 View-PORTABLE IMMEDIATE (07.05.20 @ 20:57) >  EXAM:  XR CHEST PORTABLE IMMED 1V            PROCEDURE DATE:  07/05/2020            INTERPRETATION:  Clinical History / Reason for exam: Syncope.    Comparison : Chest radiograph AP portable dated July 4, 2020 time 11:30 PM.    Technique/Positioning: AP portable time 8:42 PM, poor inspiratory effort.    Findings:    Support devices: None.    Cardiac/mediastinum/hilum: Unremarkable.    Lung parenchyma/Pleura: Within normal limits.    Impression:    In comparison with the prior chest x-ray dated July 4, 2020 time 11:30 PM    No radiographic evidence of acute cardiopulmonary disease, stable examination.    EKG leads have been removed.                  CHARI JAMES M.D., ATTENDING RADIOLOGIST  This document has been electronically signed. Jul 6 2020  9:45AM              < end of copied text > Chief complaint/Reason for consult: UGI Bleed    HPI:  Pt is a 48 YO M with a pmh of arthritis. Pt presented to the ER due to a large melanotic bowel movement associated with lightheadedness earlier this evening. Pt does endorse taking extra strength bryan, molixicam (which he has been taking for years). In the ER, pt noted to have a hb of 9.2, tachycardic although otherwise hemodynamically stable, melanotic stools in his rectal vault. Pt was seen by critical care and deemed not a candidate for an ICU admission. Pt was seen and examined at bedside. Pt denies any hx of blood in his stools, and denies nausea, vomiting, hematemesis, abdominal pain. (05 Jul 2020 23:28)    GI Updates: 49yMale pmh family history of colon cancer patient has never had an endoscopy or colonoscopy taking meloxicam and aspirin more than usual the past two weeks for knee pain presents s/p large melanotic bowel movement and near syncope. Patient reports he has never had black stools before. Currently Patient denies nausea, vomiting, hematemesis, melena, blood in stool, diarrhea, constipation, abdominal pain. Patient reports last melenic stool at 5pm yesterday nothing since.       PAST MEDICAL & SURGICAL HISTORY:   No pertinent past medical history  No significant past surgical history        Family history:  FAMILY HISTORY:  FH: peptic ulcer  -Father with colon cancer diagnosis age 40    No GI cancers in first or second degree relatives    Social History: No smoking. No alcohol. No illegal drug use.    Allergies:  No Known Allergies      MEDICATIONS: Home Medications:  Advil:  (05 Jul 2020 23:33)  aspirin 81 mg oral tablet, chewable: 1 tab(s) orally once a day (04 Jul 2020 23:17)  meloxicam 10 mg oral capsule: 1 cap(s) orally once a day (04 Jul 2020 23:17)    MEDICATIONS  (STANDING):  pantoprazole Infusion 8 mG/Hr (10 mL/Hr) IV Continuous <Continuous>  sodium chloride 0.9%. 1000 milliLiter(s) (100 mL/Hr) IV Continuous <Continuous>    MEDICATIONS  (PRN):  ondansetron Injectable 4 milliGRAM(s) IV Push every 6 hours PRN Nausea and/or Vomiting        REVIEW OF SYSTEMS  General:  No weight loss, fevers, or chills.  Eyes:  No reported pain or visual changes  ENT:  No sore throat or runny nose.  NECK: No stiffness or lymphadenopathy  CV:  No chest pain or palpitations.  Resp:  No shortness of breath, cough, wheezing or hemoptysis  GI:  No abdominal pain, nausea, vomiting, dysphagia, diarrhea or constipation. No rectal bleeding, melena, or hematemesis.  Neuro:  No tingling, numbness       VITALS:   T(F): 96.6 (07-06-20 @ 05:34), Max: 98.7 (07-05-20 @ 20:17)  HR: 77 (07-06-20 @ 05:34) (77 - 106)  BP: 110/68 (07-06-20 @ 05:34) (110/68 - 130/74)  RR: 18 (07-06-20 @ 05:34) (18 - 22)  SpO2: 99% (07-05-20 @ 22:59) (99% - 99%)    PHYSICAL EXAM:  GENERAL: AAOx3, no acute distress.  HEAD:  Atraumatic, Normocephalic  EYES: conjunctiva and sclera clear  NECK: Supple, No thyromegaly   CHEST/LUNG: Clear to auscultation bilaterally; No wheeze, rhonchi, or rales  HEART: Regular rate and rhythm; normal S1, S2, No murmurs.  ABDOMEN: Soft, nontender, nondistended; Bowel sounds present, no abdominal bruit, masses, or hepatosplenomegaly  NEUROLOGY: No asterixis or tremor  SKIN: Intact, no jaundice          LABS:  07-05    136  |  104  |  37<H>  ----------------------------<  119<H>  4.3   |  22  |  0.9    Ca    8.4<L>      05 Jul 2020 20:40  Mg     2.0     07-04    TPro  5.7<L>  /  Alb  4.1  /  TBili  <0.2  /  DBili  x   /  AST  17  /  ALT  29  /  AlkPhos  38  07-05                          7.9    7.76  )-----------( 176      ( 06 Jul 2020 06:32 )             23.3     LIVER FUNCTIONS - ( 05 Jul 2020 20:40 )  Alb: 4.1 g/dL / Pro: 5.7 g/dL / ALK PHOS: 38 U/L / ALT: 29 U/L / AST: 17 U/L / GGT: x           PT/INR - ( 05 Jul 2020 20:40 )   PT: 11.60 sec;   INR: 1.01 ratio         PTT - ( 05 Jul 2020 20:40 )  PTT:28.7 sec    IMAGING:    < from: Xray Chest 1 View-PORTABLE IMMEDIATE (07.05.20 @ 20:57) >  EXAM:  XR CHEST PORTABLE IMMED 1V            PROCEDURE DATE:  07/05/2020            INTERPRETATION:  Clinical History / Reason for exam: Syncope.    Comparison : Chest radiograph AP portable dated July 4, 2020 time 11:30 PM.    Technique/Positioning: AP portable time 8:42 PM, poor inspiratory effort.    Findings:    Support devices: None.    Cardiac/mediastinum/hilum: Unremarkable.    Lung parenchyma/Pleura: Within normal limits.    Impression:    In comparison with the prior chest x-ray dated July 4, 2020 time 11:30 PM    No radiographic evidence of acute cardiopulmonary disease, stable examination.    EKG leads have been removed.                  CHARI JAMES M.D., ATTENDING RADIOLOGIST  This document has been electronically signed. Jul 6 2020  9:45AM              < end of copied text > Chief complaint/Reason for consult: UGI Bleed    HPI:  Pt is a 48 YO M with a pmh of arthritis. Pt presented to the ER due to a large melenic bowel movement associated with lightheadedness earlier this evening. Pt does endorse taking extra strength bryan, molixicam (which he has been taking for years). In the ER, pt noted to have a hb of 9.2, tachycardic although otherwise hemodynamically stable, melanotic stools in his rectal vault. Pt was seen by critical care and deemed not a candidate for an ICU admission. Pt was seen and examined at bedside. Pt denies any hx of blood in his stools, and denies nausea, vomiting, hematemesis, abdominal pain. (05 Jul 2020 23:28)    GI Updates: 49yMale pmh family history of colon cancer patient has never had an endoscopy or colonoscopy taking meloxicam and aspirin more than usual the past two weeks for knee pain presents s/p large melenic bowel movement and near syncope. Patient reports he has never had black stools before. Currently Patient denies nausea, vomiting, hematemesis, melena, blood in stool, diarrhea, constipation, abdominal pain. Patient reports last melenic stool at 5pm yesterday nothing since.       PAST MEDICAL & SURGICAL HISTORY:   No pertinent past medical history  No significant past surgical history        Family history:  FAMILY HISTORY:  FH: peptic ulcer  -Father with colon cancer diagnosis age 40    No GI cancers in first or second degree relatives    Social History: No smoking. No alcohol. No illegal drug use.    Allergies:  No Known Allergies      MEDICATIONS: Home Medications:  Advil:  (05 Jul 2020 23:33)  aspirin 81 mg oral tablet, chewable: 1 tab(s) orally once a day (04 Jul 2020 23:17)  meloxicam 10 mg oral capsule: 1 cap(s) orally once a day (04 Jul 2020 23:17)    MEDICATIONS  (STANDING):  pantoprazole Infusion 8 mG/Hr (10 mL/Hr) IV Continuous <Continuous>  sodium chloride 0.9%. 1000 milliLiter(s) (100 mL/Hr) IV Continuous <Continuous>    MEDICATIONS  (PRN):  ondansetron Injectable 4 milliGRAM(s) IV Push every 6 hours PRN Nausea and/or Vomiting        REVIEW OF SYSTEMS  General:  No weight loss, fevers, or chills.  Eyes:  No reported pain or visual changes  ENT:  No sore throat or runny nose.  NECK: No stiffness or lymphadenopathy  CV:  No chest pain or palpitations.  Resp:  No shortness of breath, cough, wheezing or hemoptysis  GI:  No abdominal pain, nausea, vomiting, dysphagia, diarrhea or constipation. No rectal bleeding, melena, or hematemesis.  Neuro:  No tingling, numbness       VITALS:   T(F): 96.6 (07-06-20 @ 05:34), Max: 98.7 (07-05-20 @ 20:17)  HR: 77 (07-06-20 @ 05:34) (77 - 106)  BP: 110/68 (07-06-20 @ 05:34) (110/68 - 130/74)  RR: 18 (07-06-20 @ 05:34) (18 - 22)  SpO2: 99% (07-05-20 @ 22:59) (99% - 99%)    PHYSICAL EXAM:  GENERAL: AAOx3, no acute distress.  HEAD:  Atraumatic, Normocephalic  EYES: conjunctiva and sclera clear  NECK: Supple, No thyromegaly   CHEST/LUNG: Clear to auscultation bilaterally; No wheeze, rhonchi, or rales  HEART: Regular rate and rhythm; normal S1, S2, No murmurs.  ABDOMEN: Soft, nontender, nondistended; Bowel sounds present, no abdominal bruit, masses, or hepatosplenomegaly  NEUROLOGY: No asterixis or tremor  SKIN: Intact, no jaundice          LABS:  07-05    136  |  104  |  37<H>  ----------------------------<  119<H>  4.3   |  22  |  0.9    Ca    8.4<L>      05 Jul 2020 20:40  Mg     2.0     07-04    TPro  5.7<L>  /  Alb  4.1  /  TBili  <0.2  /  DBili  x   /  AST  17  /  ALT  29  /  AlkPhos  38  07-05                          7.9    7.76  )-----------( 176      ( 06 Jul 2020 06:32 )             23.3     LIVER FUNCTIONS - ( 05 Jul 2020 20:40 )  Alb: 4.1 g/dL / Pro: 5.7 g/dL / ALK PHOS: 38 U/L / ALT: 29 U/L / AST: 17 U/L / GGT: x           PT/INR - ( 05 Jul 2020 20:40 )   PT: 11.60 sec;   INR: 1.01 ratio         PTT - ( 05 Jul 2020 20:40 )  PTT:28.7 sec    IMAGING:    < from: Xray Chest 1 View-PORTABLE IMMEDIATE (07.05.20 @ 20:57) >  EXAM:  XR CHEST PORTABLE IMMED 1V            PROCEDURE DATE:  07/05/2020            INTERPRETATION:  Clinical History / Reason for exam: Syncope.    Comparison : Chest radiograph AP portable dated July 4, 2020 time 11:30 PM.    Technique/Positioning: AP portable time 8:42 PM, poor inspiratory effort.    Findings:    Support devices: None.    Cardiac/mediastinum/hilum: Unremarkable.    Lung parenchyma/Pleura: Within normal limits.    Impression:    In comparison with the prior chest x-ray dated July 4, 2020 time 11:30 PM    No radiographic evidence of acute cardiopulmonary disease, stable examination.    EKG leads have been removed.                  CHARI JAMES M.D., ATTENDING RADIOLOGIST  This document has been electronically signed. Jul 6 2020  9:45AM              < end of copied text >

## 2020-07-06 NOTE — CONSULT NOTE ADULT - ASSESSMENT
49yMale pmh family history of colon cancer patient has never had an endoscopy or colonoscopy taking meloxicam and aspirin more than usual the past two weeks for knee pain presents s/p large melanotic bowel movement and near syncope. Patient reports he has never had black stools before. 49yMale pmh family history of colon cancer patient has never had an endoscopy or colonoscopy taking meloxicam and aspirin more than usual the past two weeks for knee pain presents s/p large melanotic bowel movement and near syncope. Patient reports he has never had black stools before.    Problem 1-Melenic stool, near syncope, acute blood loss anemia   BUN disproportionately elevated to creatinine   Rec  -EGD today  -The benefits of endoscopy as well as the risks, such as GI bleeding, aspiration pneumonia, perforation, damage or loss of teeth, reaction to medication, or death  were reviewed with the patient.   -Maintain Hemodynamic Stability   -Monitor CBC  -CMP,Optimize Electrolytes  -PT,PTT,INR  -Monitor H and H  -Transfuse prn to hgb >8  -Two large bore IV lines  -Continue PPI BID  -Monitor Vital Signs  -Keep patient NPO  -Monitor Stool For blood, frequency, consistency, melena  -Active Type and Screen    Problem 2-CRC screening, Increased risk father with Colon cancer  Rec  -Follow up with our GI office located on [address] [place] [address], Phone number [phone] with Dr. Hernandez for colonoscopy evaluation 49yMale pmh family history of colon cancer patient has never had an endoscopy or colonoscopy taking meloxicam and aspirin more than usual the past two weeks for knee pain presents s/p large melanotic bowel movement and near syncope. Patient reports he has never had black stools before.    Problem 1-Melenic stool, near syncope, acute blood loss anemia   BUN disproportionately elevated to creatinine   Rec  -EGD today  -The benefits of endoscopy as well as the risks, such as GI bleeding, aspiration pneumonia, perforation, damage or loss of teeth, reaction to medication, or death  were reviewed with the patient.   -Maintain Hemodynamic Stability   -Monitor CBC  -CMP,Optimize Electrolytes  -PT,PTT,INR  -Transfuse prn to hgb >8  -Two large bore IV lines  -Continue PPI BID  -Monitor Vital Signs  -Keep patient NPO  -Monitor Stool For blood, frequency, consistency, melena  -Active Type and Screen    Problem 2-CRC screening, Increased risk father with Colon cancer  Rec  -Follow up with our GI office located on [address] [place] [address], Phone number [phone] with Dr. Hernandez for colonoscopy evaluation 49yMale pmh family history of colon cancer patient has never had an endoscopy or colonoscopy taking meloxicam and aspirin more than usual the past two weeks for knee pain presents s/p large melenic bowel movement and near syncope. Patient reports he has never had black stools before.    Problem 1-Melenic stool, near syncope, acute blood loss anemia   BUN disproportionately elevated to creatinine   Rec  -EGD today  -The benefits of endoscopy as well as the risks, such as GI bleeding, aspiration pneumonia, perforation, damage or loss of teeth, reaction to medication, or death  were reviewed with the patient.   -Maintain Hemodynamic Stability   -Monitor CBC  -CMP,Optimize Electrolytes  -PT,PTT,INR  -Transfuse prn to hgb >8  -Two large bore IV lines  -Continue PPI BID  -Monitor Vital Signs  -Keep patient NPO  -Monitor Stool For blood, frequency, consistency, melena  -Active Type and Screen    Problem 2-CRC screening, Increased risk father with Colon cancer  Rec  -Follow up with our GI office located on [address] [place] [address], Phone number [phone] with Dr. Hernandez for colonoscopy evaluation

## 2020-07-07 ENCOUNTER — TRANSCRIPTION ENCOUNTER (OUTPATIENT)
Age: 50
End: 2020-07-07

## 2020-07-07 VITALS
DIASTOLIC BLOOD PRESSURE: 68 MMHG | RESPIRATION RATE: 16 BRPM | SYSTOLIC BLOOD PRESSURE: 123 MMHG | HEART RATE: 89 BPM | TEMPERATURE: 98 F

## 2020-07-07 LAB
ANION GAP SERPL CALC-SCNC: 9 MMOL/L — SIGNIFICANT CHANGE UP (ref 7–14)
BUN SERPL-MCNC: 14 MG/DL — SIGNIFICANT CHANGE UP (ref 10–20)
CALCIUM SERPL-MCNC: 8.7 MG/DL — SIGNIFICANT CHANGE UP (ref 8.5–10.1)
CHLORIDE SERPL-SCNC: 106 MMOL/L — SIGNIFICANT CHANGE UP (ref 98–110)
CO2 SERPL-SCNC: 24 MMOL/L — SIGNIFICANT CHANGE UP (ref 17–32)
CREAT SERPL-MCNC: 0.8 MG/DL — SIGNIFICANT CHANGE UP (ref 0.7–1.5)
GLUCOSE SERPL-MCNC: 111 MG/DL — HIGH (ref 70–99)
HCT VFR BLD CALC: 26.4 % — LOW (ref 42–52)
HCT VFR BLD CALC: 26.7 % — LOW (ref 42–52)
HGB BLD-MCNC: 8.9 G/DL — LOW (ref 14–18)
HGB BLD-MCNC: 9 G/DL — LOW (ref 14–18)
MCHC RBC-ENTMCNC: 30.4 PG — SIGNIFICANT CHANGE UP (ref 27–31)
MCHC RBC-ENTMCNC: 30.5 PG — SIGNIFICANT CHANGE UP (ref 27–31)
MCHC RBC-ENTMCNC: 33.3 G/DL — SIGNIFICANT CHANGE UP (ref 32–37)
MCHC RBC-ENTMCNC: 34.1 G/DL — SIGNIFICANT CHANGE UP (ref 32–37)
MCV RBC AUTO: 89.5 FL — SIGNIFICANT CHANGE UP (ref 80–94)
MCV RBC AUTO: 91.1 FL — SIGNIFICANT CHANGE UP (ref 80–94)
NRBC # BLD: 0 /100 WBCS — SIGNIFICANT CHANGE UP (ref 0–0)
NRBC # BLD: 0 /100 WBCS — SIGNIFICANT CHANGE UP (ref 0–0)
PLATELET # BLD AUTO: 201 K/UL — SIGNIFICANT CHANGE UP (ref 130–400)
PLATELET # BLD AUTO: 210 K/UL — SIGNIFICANT CHANGE UP (ref 130–400)
POTASSIUM SERPL-MCNC: 4.2 MMOL/L — SIGNIFICANT CHANGE UP (ref 3.5–5)
POTASSIUM SERPL-SCNC: 4.2 MMOL/L — SIGNIFICANT CHANGE UP (ref 3.5–5)
RBC # BLD: 2.93 M/UL — LOW (ref 4.7–6.1)
RBC # BLD: 2.95 M/UL — LOW (ref 4.7–6.1)
RBC # FLD: 13.5 % — SIGNIFICANT CHANGE UP (ref 11.5–14.5)
RBC # FLD: 13.6 % — SIGNIFICANT CHANGE UP (ref 11.5–14.5)
SODIUM SERPL-SCNC: 139 MMOL/L — SIGNIFICANT CHANGE UP (ref 135–146)
WBC # BLD: 7.42 K/UL — SIGNIFICANT CHANGE UP (ref 4.8–10.8)
WBC # BLD: 7.43 K/UL — SIGNIFICANT CHANGE UP (ref 4.8–10.8)
WBC # FLD AUTO: 7.42 K/UL — SIGNIFICANT CHANGE UP (ref 4.8–10.8)
WBC # FLD AUTO: 7.43 K/UL — SIGNIFICANT CHANGE UP (ref 4.8–10.8)

## 2020-07-07 PROCEDURE — 99239 HOSP IP/OBS DSCHRG MGMT >30: CPT

## 2020-07-07 PROCEDURE — 99232 SBSQ HOSP IP/OBS MODERATE 35: CPT

## 2020-07-07 RX ORDER — FERROUS SULFATE 325(65) MG
1 TABLET ORAL
Qty: 30 | Refills: 0
Start: 2020-07-07 | End: 2020-08-05

## 2020-07-07 RX ORDER — FERROUS SULFATE 325(65) MG
325 TABLET ORAL DAILY
Refills: 0 | Status: DISCONTINUED | OUTPATIENT
Start: 2020-07-07 | End: 2020-07-07

## 2020-07-07 RX ORDER — MELOXICAM 15 MG/1
1 TABLET ORAL
Qty: 0 | Refills: 0 | DISCHARGE

## 2020-07-07 RX ORDER — ASPIRIN/CALCIUM CARB/MAGNESIUM 324 MG
1 TABLET ORAL
Qty: 0 | Refills: 0 | DISCHARGE

## 2020-07-07 RX ORDER — IBUPROFEN 200 MG
0 TABLET ORAL
Qty: 0 | Refills: 0 | DISCHARGE

## 2020-07-07 RX ORDER — PANTOPRAZOLE SODIUM 20 MG/1
1 TABLET, DELAYED RELEASE ORAL
Qty: 60 | Refills: 0
Start: 2020-07-07 | End: 2020-08-05

## 2020-07-07 RX ADMIN — PANTOPRAZOLE SODIUM 40 MILLIGRAM(S): 20 TABLET, DELAYED RELEASE ORAL at 05:11

## 2020-07-07 RX ADMIN — Medication 325 MILLIGRAM(S): at 11:28

## 2020-07-07 NOTE — PROGRESS NOTE ADULT - ASSESSMENT
49yMale pmh family history of colon cancer patient has never had an endoscopy or colonoscopy taking meloxicam and aspirin more than usual the past two weeks for knee pain presents s/p large melanotic bowel movement and near syncope. Patient reports he has never had black stools before.    S/P EGD yesterday  Impressions:    Normal mucosa in the whole esophagus.    Erythema in the stomach compatible with non-erosive gastritis. (Biopsy).    Ulcers in the duodenal bulb.    No blood was noted in the esophagus, stomach or duodenum.     Problem 1-Melenic stool, near syncope, acute blood loss anemia   BUN disproportionately elevated to creatinine   Ulcers in duodenal bulb  Rec  -Avoid NSAIDS, alcohol and smoking. Pantoprazole orally 40mg twice daily.  -advanced diet as tolerated  -The ulcers identified are the likely culprits in his GI bleeding. If he bleeds again though would recommend colonoscopy  -Maintain Hemodynamic Stability   -Monitor CBC  -CMP,Optimize Electrolytes  -Transfuse prn to hgb >8  -Continue PPI BID  -Monitor Vital Signs  -Monitor Stool For blood, frequency, consistency, melena  -Active Type and Screen    Problem 2-CRC screening, Increased risk father with Colon cancer  Rec  -Follow up with our GI office located on [address] [place] [address], Phone number [phone] with Dr. Hernandez for colonoscopy evaluation 49yMale pmh family history of colon cancer patient has never had an endoscopy or colonoscopy taking meloxicam and aspirin more than usual the past two weeks for knee pain presents s/p large melenic bowel movement and near syncope. Patient reports he has never had black stools before.    S/P EGD yesterday  Impressions:    Normal mucosa in the whole esophagus.    Erythema in the stomach compatible with non-erosive gastritis. (Biopsy).    Ulcers in the duodenal bulb.    No blood was noted in the esophagus, stomach or duodenum.     Problem 1-Melenic stool, near syncope, acute blood loss anemia   BUN was disproportionately elevated to creatinine   Ulcers in duodenal bulb  Rec  -Avoid NSAIDS, alcohol and smoking. Pantoprazole orally 40mg twice daily.  -advanced diet as tolerated  -The ulcers identified are the likely culprits in his GI bleeding. If he bleeds again though would recommend colonoscopy  -Maintain Hemodynamic Stability   -Monitor CBC  -CMP,Optimize Electrolytes  -Transfuse prn to hgb >8  -Continue PPI BID  -Monitor Vital Signs  -Monitor Stool For blood, frequency, consistency, melena  -Active Type and Screen    Problem 2-CRC screening, Increased risk father with Colon cancer  Rec  -Follow up with our GI office located on [address] [place] [address], Phone number [phone] with Dr. Hernandez for colonoscopy evaluation

## 2020-07-07 NOTE — DISCHARGE NOTE NURSING/CASE MANAGEMENT/SOCIAL WORK - NSDCPETBCESMAN_GEN_ALL_CORE
If you are a smoker, it is important for your health to stop smoking. Please be aware that second hand smoke is also harmful. 0.21

## 2020-07-07 NOTE — DISCHARGE NOTE PROVIDER - NSDCMRMEDTOKEN_GEN_ALL_CORE_FT
ferrous sulfate 325 mg (65 mg elemental iron) oral tablet: 1 tab(s) orally once a day   pantoprazole 40 mg oral delayed release tablet: 1 tab(s) orally 2 times a day

## 2020-07-07 NOTE — DISCHARGE NOTE NURSING/CASE MANAGEMENT/SOCIAL WORK - PATIENT PORTAL LINK FT
You can access the FollowMyHealth Patient Portal offered by [facility] by registering at the following website: [email or web address]. By joining [facility]’s FollowMyHealth portal, you will also be able to view your health information using other applications (apps) compatible with our system.

## 2020-07-07 NOTE — PROGRESS NOTE ADULT - SUBJECTIVE AND OBJECTIVE BOX
49yMale  Being followed for melenic stool, s/p egd  Interval history: Patient denies nausea, vomiting, hematemesis, melena, blood in stool, diarrhea, constipation, abdominal pain. Patient comfortable no further melenic stools.      PAST MEDICAL & SURGICAL HISTORY:   No pertinent past medical history  No significant past surgical history        Social History: No smoking. No alcohol. No illegal drug use.        MEDICATIONS  (STANDING):  ferrous    sulfate 325 milliGRAM(s) Oral daily  pantoprazole    Tablet 40 milliGRAM(s) Oral two times a day    MEDICATIONS  (PRN):  ondansetron Injectable 4 milliGRAM(s) IV Push every 6 hours PRN Nausea and/or Vomiting      Allergies:  No Known Allergies        REVIEW OF SYSTEMS:  General:  No weight loss, fevers, or chills.  Eyes:  No reported pain or visual changes  ENT:  No sore throat or runny nose.  NECK: No stiffness   CV:  No chest pain or palpitations.  Resp:  No shortness of breath, cough  GI:  No abdominal pain, nausea, vomiting, dysphagia, diarrhea or constipation. No rectal bleeding, melena, or hematemesis.  Neuro:  No tingling, numbness           VITAL SIGNS:   T(F): 96.4 (20 @ 05:27), Max: 97.4 (20 @ 13:23)  HR: 75 (20 @ 05:27) (75 - 91)  BP: 112/68 (20 @ 05:27) (112/68 - 135/91)  RR: 16 (20 @ 05:27) (16 - 16)  SpO2: --    PHYSICAL EXAM:  GENERAL: AAOx3, no acute distress.  HEAD:  Atraumatic, Normocephalic  EYES: conjunctiva and sclera clear  NECK: Supple, no JVD or thyromegaly  CHEST/LUNG: Clear to auscultation bilaterally; No wheeze, rhonchi, or rales  HEART: Regular rate and rhythm; normal S1, S2, No murmurs.  ABDOMEN: Soft, nontender, nondistended; Bowel sounds present, no abdominal bruit, masses, or hepatosplenomegaly  NEUROLOGY: No asterixis or tremor.   SKIN: Intact, no jaundice            LABS:                        9.0    7.42  )-----------( 210      ( 2020 07:58 )             26.4     07-07    139  |  106  |  14  ----------------------------<  111<H>  4.2   |  24  |  0.8    Ca    8.7      2020 07:58    TPro  5.7<L>  /  Alb  4.1  /  TBili  <0.2  /  DBili  x   /  AST  17  /  ALT  29  /  AlkPhos  38  07-05    LIVER FUNCTIONS - ( 2020 20:40 )  Alb: 4.1 g/dL / Pro: 5.7 g/dL / ALK PHOS: 38 U/L / ALT: 29 U/L / AST: 17 U/L / GGT: x           PT/INR - ( 2020 20:40 )   PT: 11.60 sec;   INR: 1.01 ratio         PTT - ( 2020 20:40 )  PTT:28.7 sec    IMAGING:    < from: Xray Chest 1 View-PORTABLE IMMEDIATE (20 @ 20:57) >  EXAM:  XR CHEST PORTABLE IMMED 1V            PROCEDURE DATE:  2020            INTERPRETATION:  Clinical History / Reason for exam: Syncope.    Comparison : Chest radiograph AP portable dated 2020 time 11:30 PM.    Technique/Positioning: AP portable time 8:42 PM, poor inspiratory effort.    Findings:    Support devices: None.    Cardiac/mediastinum/hilum: Unremarkable.    Lung parenchyma/Pleura: Within normal limits.    Impression:    In comparison with the prior chest x-ray dated 2020 time 11:30 PM    No radiographic evidence of acute cardiopulmonary disease, stable examination.    EKG leads have been removed.                  CHARI JAMES M.D., ATTENDING RADIOLOGIST  This document has been electronically signed. 2020  9:45AM              < end of copied text >      < from: EGD (20 @ 13:15) >    EGD Report Date: 2020 1:15 PM   Patient Name: RADHA GORDON   MRN: [id number]   Account Number: [id number]  Gender: Male    (age): 1970 (49)   Instrument(s): GIF-H190 ([id number])([id number])    Attending:   Zacarias Hernandez MD     Fellow:   Mic Alvarez MD       Referring Physician:   ED PHYSICIAN UNASSIGNED       Nurse(s):   Sharita Machado         History of Present Illness:   H&P was previously reviewed.       Administered Medications: As per Anesthesiology Record     Indications: Melena: 578.1 - K92.1  Procedure:   The procedure, indications, preparation and potential complications were  explained to the patient, who indicated understanding and signed the  corresponding consent forms. MAC was administered by anesthesiologist.  Continuous pulse oximetry and blood pressure monitoring were used throughout the  procedure. Supplemental oxygen was used. Patient was placed in the left lateral  decubitus position. The endoscope was introduced through the mouth and advanced  under direct visualization until the second part of the duodenum was reached.  Patient tolerance to the procedure was good. The procedure was not difficult.  Blood loss was minimal.      Limitations/Complications: There were no apparent limitations or complications  Findings:   Esophagus Mucosa Normal mucosa was noted in the whole esophagus.   Stomach Mucosa Diffuse erythema of the mucosa was noted in the stomach. These  findings are compatible with non-erosive gastritis. Six cold forceps biopsies  were performed for histology.   Duodenum Excavated lesions Multiple clean based, subcentimeter ulcers were found  in the duodenal sweep and duodenal bulb. No intervention was performed   Additional duodenum findings No blood was noted in the esophagus, stomach or  duodenum.                          Impressions:    Normal mucosa in the whole esophagus.    Erythema in the stomach compatible with non-erosive gastritis. (Biopsy).    Ulcers in the duodenal bulb.    No blood was noted in the esophagus, stomach or duodenum.     Plan: Avoid NSAIDS, alcohol and smoking. Pantoprazole orally 40mg twice daily.  f/U in GI clinic in 4 weeks. Await pathology results   Clear liquid die, tadvanced as tolerated  The ulcers identified are the likely culprits in his GI bleeding. If he bleeds  again though would recommend colonoscopy      Attending Participation: I was present and participated during the entire  procedure, including non-key portions.       Zacarias Hernandez MD    Version 1, Electronically signed on 2020 3:22:27 PM by Zacarias Hernandez MD    < end of copied text >
CC.  GIB  HPI.  Patient reports no BM overnight.  afebrile.  Denies any dizziness, or lightheaded  ambulating ok    Offers no other complaints      Constitutional: No fever, fatigue or weight loss.  Skin: No rash.  Eyes: No recent vision problems or eye pain.  ENT: No congestion, ear pain, or sore throat.  Endocrine: No thyroid problems.  Cardiovascular: No chest pain or palpation.  Respiratory: No cough, shortness of breath, congestion, or wheezing.  Gastrointestinal: No abdominal pain, nausea, vomiting, or diarrhea.  Genitourinary: No dysuria.  Musculoskeletal: No joint swelling.  Neurologic: No headache.      Vital Signs Last 24 Hrs  T(C): 35.8 (07 Jul 2020 05:27), Max: 36.3 (06 Jul 2020 13:23)  T(F): 96.4 (07 Jul 2020 05:27), Max: 97.4 (06 Jul 2020 13:23)  HR: 75 (07 Jul 2020 05:27) (75 - 91)  BP: 112/68 (07 Jul 2020 05:27) (112/68 - 135/91)  BP(mean): --  RR: 16 (07 Jul 2020 05:27) (16 - 16)  SpO2: --        PHYSICAL EXAM-  GENERAL: NAD,   HEAD:  Atraumatic, Normocephalic  EYES: EOMI, PERRLA, conjunctiva and sclera clear  NECK: Supple, No JVD, Normal thyroid  NERVOUS SYSTEM:  Alert & Oriented X3, Moving all extremities  CHEST/LUNG: Clear to percussion bilaterally; No rales, rhonchi, wheezing, or rubs  HEART: Regular rate and rhythm; No murmurs, rubs, or gallops  ABDOMEN: Soft, Nontender, Nondistended; Bowel sounds present  EXTREMITIES:   No clubbing, cyanosis, or edema  SKIN: No rashes or lesions                            9.0    7.42  )-----------( 210      ( 07 Jul 2020 07:58 )             26.4     07-05    136  |  104  |  37<H>  ----------------------------<  119<H>  4.3   |  22  |  0.9    Ca    8.4<L>      05 Jul 2020 20:40    TPro  5.7<L>  /  Alb  4.1  /  TBili  <0.2  /  DBili  x   /  AST  17  /  ALT  29  /  AlkPhos  38  07-05    CARDIAC MARKERS ( 05 Jul 2020 20:40 )  x     / <0.01 ng/mL / x     / x     / x              PT/INR - ( 05 Jul 2020 20:40 )   PT: 11.60 sec;   INR: 1.01 ratio         PTT - ( 05 Jul 2020 20:40 )  PTT:28.7 sec                             MEDICATIONS  (STANDING):  pantoprazole Infusion 8 mG/Hr (10 mL/Hr) IV Continuous <Continuous>  sodium chloride 0.9%. 1000 milliLiter(s) (100 mL/Hr) IV Continuous <Continuous>    MEDICATIONS  (PRN):  ondansetron Injectable 4 milliGRAM(s) IV Push every 6 hours PRN Nausea and/or Vomiting      Imaging Personally Reviewed:     [x ] YES  [ ] NO    Consultant(s) Notes Reviewed:  [x ] YES  [ ] NO    Care Discussed with Consultants/Other Providers [x ] YES  [ ] No medical contraindication for discharge
CC.  GIB  HPI.  Patient reports dark stool overnight.  afebrile.  dizziness with ambulation    Offers no other complaints      Constitutional: No fever, fatigue or weight loss.  Skin: No rash.  Eyes: No recent vision problems or eye pain.  ENT: No congestion, ear pain, or sore throat.  Endocrine: No thyroid problems.  Cardiovascular: No chest pain or palpation.  Respiratory: No cough, shortness of breath, congestion, or wheezing.  Gastrointestinal: No abdominal pain, nausea, vomiting, or diarrhea.  Genitourinary: No dysuria.  Musculoskeletal: No joint swelling.  Neurologic: No headache.      Vital Signs Last 24 Hrs  T(C): 35.9 (07-06-20 @ 05:34), Max: 37.1 (07-05-20 @ 20:17)  T(F): 96.6 (07-06-20 @ 05:34), Max: 98.7 (07-05-20 @ 20:17)  HR: 77 (07-06-20 @ 05:34) (77 - 106)  BP: 110/68 (07-06-20 @ 05:34) (110/68 - 130/74)  BP(mean): --  RR: 18 (07-06-20 @ 05:34) (18 - 22)  SpO2: 99% (07-05-20 @ 22:59) (99% - 99%)        PHYSICAL EXAM-  GENERAL: NAD,   HEAD:  Atraumatic, Normocephalic  EYES: EOMI, PERRLA, conjunctiva and sclera clear  NECK: Supple, No JVD, Normal thyroid  NERVOUS SYSTEM:  Alert & Oriented X3, Moving all extremities  CHEST/LUNG: Clear to percussion bilaterally; No rales, rhonchi, wheezing, or rubs  HEART: Regular rate and rhythm; No murmurs, rubs, or gallops  ABDOMEN: Soft, Nontender, Nondistended; Bowel sounds present  EXTREMITIES:   No clubbing, cyanosis, or edema  SKIN: No rashes or lesions                                  7.9    7.76  )-----------( 176      ( 06 Jul 2020 06:32 )             23.3     07-05    136  |  104  |  37<H>  ----------------------------<  119<H>  4.3   |  22  |  0.9    Ca    8.4<L>      05 Jul 2020 20:40  Mg     2.0     07-04    TPro  5.7<L>  /  Alb  4.1  /  TBili  <0.2  /  DBili  x   /  AST  17  /  ALT  29  /  AlkPhos  38  07-05    CARDIAC MARKERS ( 05 Jul 2020 20:40 )  x     / <0.01 ng/mL / x     / x     / x      CARDIAC MARKERS ( 04 Jul 2020 23:32 )  x     / <0.01 ng/mL / x     / x     / x              PT/INR - ( 05 Jul 2020 20:40 )   PT: 11.60 sec;   INR: 1.01 ratio         PTT - ( 05 Jul 2020 20:40 )  PTT:28.7 sec        MEDICATIONS  (STANDING):  pantoprazole Infusion 8 mG/Hr (10 mL/Hr) IV Continuous <Continuous>  sodium chloride 0.9%. 1000 milliLiter(s) (100 mL/Hr) IV Continuous <Continuous>    MEDICATIONS  (PRN):  ondansetron Injectable 4 milliGRAM(s) IV Push every 6 hours PRN Nausea and/or Vomiting      Imaging Personally Reviewed:     [x ] YES  [ ] NO    Consultant(s) Notes Reviewed:  [x ] YES  [ ] NO    Care Discussed with Consultants/Other Providers [x ] YES  [ ] No medical contraindication for discharge

## 2020-07-07 NOTE — PROGRESS NOTE ADULT - ASSESSMENT
Patient is 48 yo male presenting with with anemia, dark stool, and dizziness    1. Anemia  -Suspected GIB  -Continue with PPI  -H/H stable  -EGD shows  Normal mucosa in the whole esophagus.  Erythema in the stomach compatible with non-erosive gastritis. (Biopsy).    Ulcers in the duodenal bulb.  No blood was noted in the esophagus, stomach or duodenum.   -Monitor H/H  -Tolerating clears.  Will advance diet as tolerated  -Outpatient follow up with GI     2. dizziness/Near syncope  -Seems to be secondary to anemia   -EKG shows NSR non-specific changes  -TTE pending  -Treat underline issues  -resolved    #Progress Note Handoff  Pending (specify):  diet and H/H  Family discussion:  plan of care was discussed with patient   in details.  all questions were answered.  seems to understand, and in agreement  Disposition:  unknown

## 2020-07-07 NOTE — DISCHARGE NOTE PROVIDER - NSDCCPCAREPLAN_GEN_ALL_CORE_FT
PRINCIPAL DISCHARGE DIAGNOSIS  Diagnosis: UGIB (upper gastrointestinal bleed)  Assessment and Plan of Treatment: continue with PPI      SECONDARY DISCHARGE DIAGNOSES  Diagnosis: Anemia  Assessment and Plan of Treatment: Continue with Iron supplement

## 2020-07-07 NOTE — DISCHARGE NOTE NURSING/CASE MANAGEMENT/SOCIAL WORK - NSDCFUADDAPPT_GEN_ALL_CORE_FT
Please follow up with your doctor in one week  please follow up with gastroenterology in one week  Avoid NSAID  recommended to follow up with your doctor for blood count in 3-4 days  Please come back to the hospital if you develop any new symptoms or concerns

## 2020-07-07 NOTE — DISCHARGE NOTE PROVIDER - HOSPITAL COURSE
Patient is 48 yo male presenting with with anemia, dark stool, and dizziness        1. Anemia    -Suspected GIB    -Continue with PPI    -H/H stable    -EGD shows  Normal mucosa in the whole esophagus.  Erythema in the stomach compatible with non-erosive gastritis. (Biopsy).      Ulcers in the duodenal bulb.  No blood was noted in the esophagus, stomach or duodenum.     -Monitor H/H    -Tolerating diet well     -Outpatient follow up with GI         2. dizziness/Near syncope    -Seems to be secondary to anemia     -EKG shows NSR non-specific changes    -TTE shows     -Treat underline issues    -resolved        Hospital  course and discharge planning were discussed with patient and significant other in great details.  all questions were answered    seems to understand, and in agreement    time 70 min Patient is 50 yo male presenting with with anemia, dark stool, and dizziness        1. Anemia    -Suspected GIB    -Continue with PPI    -H/H stable    -EGD shows  Normal mucosa in the whole esophagus.  Erythema in the stomach compatible with non-erosive gastritis. (Biopsy).      Ulcers in the duodenal bulb.  No blood was noted in the esophagus, stomach or duodenum.     -Monitor H/H    -Tolerating diet well     -Outpatient follow up with GI         2. dizziness/Near syncope    -Seems to be secondary to anemia     -EKG shows NSR non-specific changes,  trop negative X2    -TTE shows NL EF    -Treat underline issues    -resolved        Hospital  course and discharge planning were discussed with patient and significant other in great details.  all questions were answered    seems to understand, and in agreement    time 70 min

## 2020-07-07 NOTE — DISCHARGE NOTE PROVIDER - NSDCFUADDAPPT_GEN_ALL_CORE_FT
Please follow up with your doctor in one week  please follow up with gastroenterology in one week  Avoid NSAID  Please come back to the hospital if you develop any new symptoms or concerns Please follow up with your doctor in one week  please follow up with gastroenterology in one week  Avoid NSAID  recommended to follow up with your doctor for blood count in 3-4 days  Please come back to the hospital if you develop any new symptoms or concerns

## 2020-07-07 NOTE — PROGRESS NOTE ADULT - ATTENDING COMMENTS
48 yo male with duodenal ulcers and gi bleed, now resolved.  Plan  avoid nsaids  PPI  Outpatient followup in GI office as above

## 2020-07-07 NOTE — DISCHARGE NOTE PROVIDER - CARE PROVIDER_API CALL
Zacarias Hernandez  GASTROENTEROLOGY  [address] [place] [address]  Phone: [phone]  Fax: [phone]  Follow Up Time: 1 week

## 2020-07-08 LAB — SURGICAL PATHOLOGY STUDY: SIGNIFICANT CHANGE UP

## 2020-07-09 DIAGNOSIS — Z79.82 LONG TERM (CURRENT) USE OF ASPIRIN: ICD-10-CM

## 2020-07-09 DIAGNOSIS — K92.2 GASTROINTESTINAL HEMORRHAGE, UNSPECIFIED: ICD-10-CM

## 2020-07-09 DIAGNOSIS — M19.90 UNSPECIFIED OSTEOARTHRITIS, UNSPECIFIED SITE: ICD-10-CM

## 2020-07-09 DIAGNOSIS — D62 ACUTE POSTHEMORRHAGIC ANEMIA: ICD-10-CM

## 2020-07-09 DIAGNOSIS — Z11.59 ENCOUNTER FOR SCREENING FOR OTHER VIRAL DISEASES: ICD-10-CM

## 2020-07-09 DIAGNOSIS — Z80.0 FAMILY HISTORY OF MALIGNANT NEOPLASM OF DIGESTIVE ORGANS: ICD-10-CM

## 2020-07-09 DIAGNOSIS — K26.4 CHRONIC OR UNSPECIFIED DUODENAL ULCER WITH HEMORRHAGE: ICD-10-CM

## 2020-07-09 DIAGNOSIS — E66.9 OBESITY, UNSPECIFIED: ICD-10-CM

## 2020-07-09 DIAGNOSIS — Z79.899 OTHER LONG TERM (CURRENT) DRUG THERAPY: ICD-10-CM

## 2020-07-14 PROBLEM — Z00.00 ENCOUNTER FOR PREVENTIVE HEALTH EXAMINATION: Status: ACTIVE | Noted: 2020-07-14

## 2020-08-25 ENCOUNTER — APPOINTMENT (OUTPATIENT)
Dept: GASTROENTEROLOGY | Facility: CLINIC | Age: 50
End: 2020-08-25

## 2022-09-16 NOTE — PRE-OP CHECKLIST - AS TEMP SITE
pt presents to the ED by private vehicle  complains of abscess left groin area.     Noticed it while in shower.   Red painful to touch.           oral

## 2024-05-06 NOTE — ED PROVIDER NOTE - NS ED ROS FT
on the discharge service for the patient. I have reviewed and made amendments to the documentation where necessary.
Constitutional: See HPI.  Eyes: No visual changes, eye pain or discharge. No Photophobia  ENMT: No hearing changes, pain, discharge or infections. No neck pain or stiffness. No limited ROM  Cardiac: No SOB or edema. No chest pain with exertion.  Respiratory: No cough or respiratory distress.   GI: No nausea, vomiting, diarrhea or abdominal pain.  : No dysuria, frequency or burning. No Discharge  MS: No myalgia, muscle weakness, joint pain or back pain.  Neuro: see hpi   Skin: No skin rash.  Except as documented in the HPI, all other systems are negative.

## 2025-02-26 NOTE — ED ADULT NURSE NOTE - DRUG PRE-SCREENING (DAST -1)
decreased kalyani/decreased step length/decreased stride length/decreased weight-shifting ability
Statement Selected